# Patient Record
Sex: FEMALE | Race: OTHER | NOT HISPANIC OR LATINO | URBAN - METROPOLITAN AREA
[De-identification: names, ages, dates, MRNs, and addresses within clinical notes are randomized per-mention and may not be internally consistent; named-entity substitution may affect disease eponyms.]

---

## 2017-07-08 ENCOUNTER — EMERGENCY (EMERGENCY)
Facility: HOSPITAL | Age: 22
LOS: 1 days | Discharge: HOME | End: 2017-07-08
Attending: EMERGENCY MEDICINE
Payer: MEDICAID

## 2017-07-08 VITALS — TEMPERATURE: 98 F | OXYGEN SATURATION: 99 % | HEART RATE: 85 BPM | WEIGHT: 220.02 LBS | RESPIRATION RATE: 16 BRPM

## 2017-07-08 DIAGNOSIS — K08.89 OTHER SPECIFIED DISORDERS OF TEETH AND SUPPORTING STRUCTURES: ICD-10-CM

## 2017-07-08 PROCEDURE — 99283 EMERGENCY DEPT VISIT LOW MDM: CPT

## 2017-07-08 PROCEDURE — 99284 EMERGENCY DEPT VISIT MOD MDM: CPT

## 2017-07-08 RX ORDER — OXYCODONE AND ACETAMINOPHEN 5; 325 MG/1; MG/1
1 TABLET ORAL ONCE
Qty: 0 | Refills: 0 | Status: DISCONTINUED | OUTPATIENT
Start: 2017-07-08 | End: 2017-07-08

## 2017-07-08 RX ADMIN — OXYCODONE AND ACETAMINOPHEN 1 TABLET(S): 5; 325 TABLET ORAL at 15:23

## 2017-07-08 RX ADMIN — OXYCODONE AND ACETAMINOPHEN 1 TABLET(S): 5; 325 TABLET ORAL at 15:46

## 2017-07-08 NOTE — ED PROVIDER NOTE - MEDICAL DECISION MAKING DETAILS
2 y/o female presents to the ED c/o toothache x 2 weeks. Pt is well appearing, vital signs WNL, afebrile. Low suspicion for dental abscess. Pt already has appointment with dentist. Will give pain medication and  f/u with dentist.

## 2017-07-08 NOTE — ED PROVIDER NOTE - PROGRESS NOTE DETAILS
No evidence of abscess. Patient already has appointment with dentist this week. Will give Percocet now and recommend to take IBU. Return instructions discussed with patient. Pt is well appearing walking with steady gait, stable for discharge and follow up without fail with medical doctor. I had a detailed discussion with the patient and/or guardian regarding the historical points, exam findings, and any diagnostic results supporting the discharge diagnosis. Pt educated on care and need for follow up. Strict return instructions and red flag signs and symptoms discussed with patient. Questions answered. Pt shows understanding of discharge information and agrees to follow. The scribe's documentation has been prepared under my direction and personally reviewed by me in its entirety. I confirm that the note above actually reflects all work, treatment, procedures, and medical decision-making performed by me - JETT Grubbs

## 2017-07-08 NOTE — ED ADULT NURSE NOTE - OBJECTIVE STATEMENT
Patient complains of right lower tooth pain x 2 weeks. No gum swelling, bleeding, or drainage noted. Patient has a dentist appointment scheduled for this coming Tuesday. Breathing easy and unlabored, speaking in full sentences, no drooling noted. In no acute distress. MD evaluation in progress.

## 2017-07-08 NOTE — ED PROVIDER NOTE - OBJECTIVE STATEMENT
22 y/o female with no significant PMHx but PSHx of pelvic surgery presents to the ED c/o toothache x 2 weeks. Pt notes she has been taking Motrin (800 mg) x 2 days as well as ice/warm packs to no relief. Pt also tried to do a temporary fill-in herself on the tooth. Pt also notes she has an appointment with her dentist in 4 days. Pt denies fevers, chills or any other complaints. NKDA. 22 y/o female with no significant PMHx presents to the ED c/o toothache x 2 weeks. Pt notes she has been taking Motrin (800 mg) x 2 days as well as ice/warm packs to no relief. Pt also tried to do a temporary fill-in herself on the tooth. Pt also notes she has an appointment with her dentist in 4 days. Pt denies fevers, chills or any other complaints. NKDA.

## 2017-07-08 NOTE — ED PROVIDER NOTE - TOOTH FINDINGS
tooth #31 unable to visualize due to temporary filling by pt, no peridental gum swelling, redness, drainage or fluctuance, no TMJ tenderness

## 2017-11-14 NOTE — ED PROVIDER NOTE - THROAT, MLM
Kaylah Silva  1992   Chief Complaint   Patient presents with    Knee Pain     left        HISTORY OF PRESENT ILLNESS  Kaylah Silva is a 31 y.o. female who presents today for reevaluation of left knee pain and MRI results. Initial injury 10/4/17 while playing volleyball. She rates her pain 1/10 today. She states she has been working on her mobility and is now able to straighten the knee. Associated symptoms include instability and swelling. Is still utilizing crutches and knee immobilizer. Patient denies any fever, chills, chest pain, shortness of breath or calf pain. There are no new illness or injuries to report since last seen in the office. There are no changes to medications, allergies, family or social history. PHYSICAL EXAM:   Visit Vitals    /75    Pulse (!) 58    Resp 16    Ht 5' 8\" (1.727 m)    SpO2 100%     The patient is a well-developed, well-nourished female   in no acute distress. The patient is alert and oriented times three. The patient is alert and oriented times three. Mood and affect are normal.  LYMPHATIC: lymph nodes are not enlarged and are within normal limits  SKIN: normal in color and non tender to palpation. There are no bruises or abrasions noted. NEUROLOGICAL: Motor sensory exam is within normal limits. Reflexes are equal bilaterally.  There is normal sensation to pinprick and light touch  MUSCULOSKELETAL:  Examination Left knee   Skin Intact   Range of motion 0-110   Effusion +   Medial joint line tenderness +   Lateral joint line tenderness -   Tenderness Pes Bursa -   Tenderness insertion MCL +   Tenderness insertion LCL -   Jias -   Patella crepitus -   Patella grind -   Lachman +   Pivot shift +   Anterior drawer +   Posterior drawer -   Varus stress -   Valgus stress -   Neurovascular Intact   Calf Swelling and Tenderness to Palpation -   Rob's Test -   Hamstring Cord Tightness -        IMAGING:   MRI of the left knee dated 11/9/17 was reviewed and read:   IMPRESSION:  1. Complete anterior cruciate ligament tear with associated small minimally depressed subchondral impaction fracture involving the lateral femoral condyle, small non depressed posterior medial tibial plateau impaction fracture, a small posterior lateral tibial plateau bone contusion. 2. Grade 1 MCL sprain and mild medial meniscal capsular junction injury. No evidence of meniscus tear. 3. Minor articular cartilage degeneration of the labral tibial plateau. 4. Large joint effusion. XR of the left knee dated 11/7/17 was reviewed and read: normal    IMPRESSION:      ICD-10-CM ICD-9-CM    1. Rupture of anterior cruciate ligament of left knee, initial encounter S83.512A 844.2         PLAN:   1. I discussed the results of the MRI and the treatment options with the patient. Instructed to continue to work on mobility and quad tone prior to surgery. I discussed the risks and benefits and potential adverse outcomes of both operative vs non operative treatment of left knee ACL tear with the patient. Patient wishes to proceed with left knee arthroscopic ACL reconstruction. Risks of operative intervention include but not limited to bleeding, infection, deep vein thrombosis, pulmonary embolism, death, limb length discrepancy, reflexive sympathetic dystrophy, fat embolism syndrome,damage to blood vessels and nerves, malunion, non-union, delayed union, failure of hardware, post traumatic arthritis, stroke, heart attack, and death. Patient understands that infection may arise and may require numerous surgeries. History and physical exam scheduled for a later date. Risk factors include: n/a  2. No cortisone injection indicated today   3. No Physical/Occupational Therapy indicated today  4. No diagnostic test indicated today  5. No durable medical equipment indicated today  6. No referral indicated today   7. No medications indicated today  8.  No Narcotic indicated today     RTC H&P  Follow-up Disposition: Not on File    Scribed by Darvin Osborne [address]) as dictated by Chencho Mooney MD    I, Dr. Chencho Mooney, confirm that all documentation is accurate.     Chencho Mooney M.D.   [facility] uvula midline, no vesicles, no redness, and no oropharyngeal exudate.

## 2023-01-28 NOTE — ED PROVIDER NOTE - EAR
[facility] EMERGENCY DEPT  EMERGENCY DEPARTMENT ENCOUNTER       Pt Name: Bella Stevenson  MRN: [id number]  [place] 1977  Date of evaluation: 1/27/2023  Provider: Bethel Arshad NP   PCP: Gabe Low MD  Note Started: 10:38 PM 1/27/23     CHIEF COMPLAINT       Chief Complaint   Patient presents with    Dental Pain     Patient presents to ED with c/o dental pain post 6 teeth extraction today and complaining about pain. HISTORY OF PRESENT ILLNESS: 1 or more elements      History From: Patient  HPI Limitations : None     Bella Stevenson is a 54 y.o. female who presents   With dental pain acute onset today. Patient states that she had 5 teeth extracted and was only given Motrin for pain. Patient reports bleeding from extraction sites. Nursing Notes were all reviewed and agreed with or any disagreements were addressed in the HPI. REVIEW OF SYSTEMS      Review of Systems   Constitutional:  Negative for fatigue and fever. HENT:  Positive for dental problem. Respiratory:  Negative for shortness of breath and wheezing. Cardiovascular:  Negative for chest pain. Gastrointestinal:  Negative for abdominal pain. Musculoskeletal:  Negative for arthralgias, myalgias, neck pain and neck stiffness. Skin:  Negative for pallor and rash. Neurological:  Negative for dizziness, tremors and headaches. All other systems reviewed and are negative. Positives and Pertinent negatives as per HPI.     PAST HISTORY     Past Medical History:  Past Medical History:   Diagnosis Date    angina     Angina at rest [facility])     Breast pain     since 2016, right breast, on and off    Chiari malformation     Heart abnormalities     leaky heart valve, murmur    Hypertension     Other ill-defined conditions(799.89)     cardiac arrhythmias    Other ill-defined conditions(799.89)     neuropathy    Psychiatric disorder     anxiety and depression    Stroke [facility])     TIA       Past Surgical History:  Past Surgical History:   Procedure Laterality Date    HX GYN      HX ORTHOPAEDIC      screws in left ankle    HX OTHER SURGICAL      surgery to back of head due to fractured skull    MT UNLISTED NEUROLOGICAL/NEUROMUSCULAR DX PX      MT UNLISTED PROCEDURE ABDOMEN PERITONEUM & OMENTUM      laporscopic surgery post ruptured fallopian tube       Family History:  Family History   Problem Relation Age of Onset    Hypertension Mother     Stroke Mother     Diabetes Mother     Drug Abuse Father        Social History:  Social History     Tobacco Use    Smoking status: Former     Packs/day: 0.25     Years: 23.00     Pack years: 5.75     Types: Cigarettes     Quit date: 3/5/2021     Years since quittin.8    Smokeless tobacco: Never   Vaping Use    Vaping Use: Some days   Substance Use Topics    Alcohol use: Not Currently    Drug use: Not Currently     Types: Marijuana     Comment: former crack user approx clean        Allergies: Allergies   Allergen Reactions    Metoprolol Other (comments)     headache    Toradol [Ketorolac] Hives     Tolerated aspirin 10/6    Toradol [Ketorolac] Itching       CURRENT MEDICATIONS      Discharge Medication List as of 2023  7:49 PM        CONTINUE these medications which have NOT CHANGED    Details   amLODIPine (NORVASC) 10 mg tablet Take 1 Tablet by mouth daily. Indications: high blood pressure, Normal, Disp-90 Tablet, R-1      hydroCHLOROthiazide (MICROZIDE) 12.5 mg capsule Take 1 Capsule by mouth daily. Indications: high blood pressure, Normal, Disp-90 Capsule, R-1      aspirin 81 mg chewable tablet Take 1 Tablet by mouth daily. , Normal, Disp-90 Tablet, R-1      atorvastatin (LIPITOR) 80 mg tablet Take 1 Tablet by mouth nightly., Normal, Disp-90 Tablet, R-1      clopidogreL (Plavix) 75 mg tab Take 1 Tablet by mouth daily. , Normal, Disp-90 Tablet, R-1      doxazosin (CARDURA) 2 mg tablet Take 1 Tablet by mouth nightly., Normal, Disp-90 Tablet, R-1      butalbital-acetaminophen-caff (FIORICET) -40 mg per capsule Take 1 Capsule by mouth every six (6) hours as needed for Headache., Normal, Disp-14 Capsule, R-0      cariprazine (VRAYLAR) 3 mg capsule Take 3 mg by mouth daily. , Historical Med      diclofenac (VOLTAREN) 1 % gel Apply  to affected area four (4) times daily as needed for Pain., Historical Med      pantoprazole (PROTONIX) 20 mg tablet Take 20 mg by mouth Daily (before breakfast). , Historical Med      fluticasone propionate (FLONASE) 50 mcg/actuation nasal spray 2 Sprays by Both Nostrils route daily as needed for Allergies. , Historical Med      hydrOXYzine HCL (ATARAX) 25 mg tablet Take 25 mg by mouth three (3) times daily as needed for Anxiety. , Historical Med      !! lidocaine (LIDODERM) 5 % 1 Patch by TransDERmal route daily as needed for Pain. Apply patch to the affected area for 12 hours a day and remove for 12 hours a day., Historical Med      diphenhydrAMINE-zinc acetate 1%-0.1% (Benadryl Itch Stopping) topical cream Apply  to affected area three (3) times daily as needed for Itching., Normal, Disp-30 g, R-0      cyclobenzaprine (FLEXERIL) 10 mg tablet Take 1 Tablet by mouth three (3) times daily as needed for Muscle Spasm(s). , Normal, Disp-20 Tablet, R-0      acetaminophen (TYLENOL) 500 mg tablet Take 2 Tabs by mouth every eight (8) hours as needed for Pain., Print, Disp-30 Tab, R-0      ibuprofen (MOTRIN) 400 mg tablet Take 1 Tab by mouth every six (6) hours as needed for Pain., Print, Disp-20 Tab, R-0      !! lidocaine (LIDODERM) 5 % Apply patch to the affected area for 12 hours a day and remove for 12 hours a day., Print, Disp-1 Each, R-0       !! - Potential duplicate medications found. Please discuss with provider.           PHYSICAL EXAM      ED Triage Vitals   ED Encounter Vitals Group      BP 01/27/23 1726 (!) 157/97      Pulse (Heart Rate) 01/27/23 1726 (!) 125      Resp Rate 01/27/23 1726 18      Temp 01/27/23 1928 98.7 °F (37.1 °C)      Temp src --       O2 Sat (%) 01/27/23 1726 98 %      Weight 01/27/23 1726 140 lb      Height 01/27/23 1726 5' 2\"        Physical Exam  Vitals and nursing note reviewed. Constitutional:       General: She is not in acute distress. Appearance: Normal appearance. She is well-developed. HENT:      Head: Normocephalic and atraumatic. Right Ear: External ear normal.      Left Ear: External ear normal.      Nose: Nose normal.      Mouth/Throat:      Mouth: Mucous membranes are moist.      Comments: 5 upper teeth extracted 6 extraction sites blood clots minimal bleeding remaining teeth have been extracted. Eyes:      Conjunctiva/sclera: Conjunctivae normal.   Cardiovascular:      Rate and Rhythm: Normal rate and regular rhythm. Heart sounds: Normal heart sounds. Pulmonary:      Effort: Pulmonary effort is normal. No respiratory distress. Breath sounds: Normal breath sounds. No wheezing. Abdominal:      General: Bowel sounds are normal.      Palpations: Abdomen is soft. Tenderness: There is no abdominal tenderness. Musculoskeletal:         General: Normal range of motion. Cervical back: Normal range of motion and neck supple. Lymphadenopathy:      Cervical: No cervical adenopathy. Skin:     General: Skin is warm and dry. Findings: No rash. Neurological:      Mental Status: She is alert and oriented to person, place, and time. Cranial Nerves: No cranial nerve deficit. Coordination: Coordination normal.   Psychiatric:         Behavior: Behavior normal.         Thought Content: Thought content normal.         Judgment: Judgment normal.        DIAGNOSTIC RESULTS   LABS:     No results found for this or any previous visit (from the past 12 hour(s)). EKG: When ordered, EKG's are interpreted by the Emergency Department Physician in the absence of a cardiologist.  Please see their note for interpretation of EKG.       RADIOLOGY:  Non-plain film images such as CT, Ultrasound and MRI are read by the radiologistBernice Bryant radiographic images are visualized and preliminarily interpreted by the ED Provider with the below findings:          Interpretation per the Radiologist below, if available at the time of this note:     No results found. PROCEDURES   Unless otherwise noted below, none  Procedures     CRITICAL CARE TIME       EMERGENCY DEPARTMENT COURSE and DIFFERENTIAL DIAGNOSIS/MDM   Vitals:    Vitals:    01/27/23 1726 01/27/23 1928   BP: (!) 157/97    Pulse: (!) 125    Resp: 18    Temp:  98.7 °F (37.1 °C)   SpO2: 98%    Weight: 63.5 kg (140 lb)    Height: 5' 2\" (1.575 m)         Patient was given the following medications:  Medications   HYDROcodone-acetaminophen (NORCO) 5-325 mg per tablet 1 Tablet (1 Tablet Oral Given 1/27/23 1926)       CONSULTS: (Who and What was discussed)  None    Chronic Conditions: TIA HTN heart murmur  angina    Social Determinants affecting Dx or Tx: None    Records Reviewed (source and summary of external records): None    CC/HPI Summary, DDx, ED Course, and Reassessment: 67-year-old female presents with dental pain post extraction of 5 upper teeth. Bleeding is controlled patient states she has only been taking Motrin and it is not helping her pain. Exam noted clots forming on extraction sites minimal bleeding I will order 1 hydrocodone for pain discharge patient differential diagnosis post op pain tooth extraction. Disposition Considerations (Tests not done, Shared Decision Making, Pt Expectation of Test or Tx.):      FINAL IMPRESSION     1. S/P tooth extraction    2. [id number] [address]          DISPOSITION/PLAN   Discharged    Discharge Note: The patient is stable for discharge home. The signs, symptoms, diagnosis, and discharge instructions have been discussed, understanding conveyed, and agreed upon. The patient is to follow up as recommended or return to ER should their symptoms worsen.       PATIENT REFERRED TO:  Follow-up Information       Follow up With Specialties Details Why Contact Info    follow up with your dentist next week                  DISCHARGE MEDICATIONS:  Discharge Medication List as of 1/27/2023  7:49 PM            DISCONTINUED MEDICATIONS:  Discharge Medication List as of 1/27/2023  7:49 PM          Shared Not Shared AYDE: I have seen and evaluated the patient. My supervision physician was available for consultation. I am the Primary Clinician of Record. Sukumar Louie NP (electronically signed)    (Please note that parts of this dictation were completed with voice recognition software. Quite often unanticipated grammatical, syntax, homophones, and other interpretive errors are inadvertently transcribed by the computer software. Please disregards these errors.  Please excuse any errors that have escaped final proofreading.) bilateral TM's clear